# Patient Record
Sex: MALE | Race: BLACK OR AFRICAN AMERICAN | NOT HISPANIC OR LATINO | URBAN - METROPOLITAN AREA
[De-identification: names, ages, dates, MRNs, and addresses within clinical notes are randomized per-mention and may not be internally consistent; named-entity substitution may affect disease eponyms.]

---

## 2023-07-26 ENCOUNTER — NON-PROVIDER VISIT (OUTPATIENT)
Dept: OCCUPATIONAL MEDICINE | Facility: CLINIC | Age: 58
End: 2023-07-26

## 2023-07-26 DIAGNOSIS — Z02.1 PRE-EMPLOYMENT HEALTH SCREENING EXAMINATION: ICD-10-CM

## 2023-07-26 DIAGNOSIS — Z02.1 PRE-EMPLOYMENT DRUG SCREENING: ICD-10-CM

## 2023-07-26 LAB
AMP AMPHETAMINE: NORMAL
COC COCAINE: NORMAL
INT CON NEG: NORMAL
INT CON POS: NORMAL
MET METHAMPHETAMINES: NORMAL
OPI OPIATES: NORMAL
PCP PHENCYCLIDINE: NORMAL
POC DRUG COMMENT 753798-OCCUPATIONAL HEALTH: NEGATIVE
THC: NORMAL

## 2023-07-26 PROCEDURE — 80305 DRUG TEST PRSMV DIR OPT OBS: CPT | Performed by: PREVENTIVE MEDICINE

## 2025-04-24 ENCOUNTER — PHARMACY VISIT (OUTPATIENT)
Dept: PHARMACY | Facility: MEDICAL CENTER | Age: 60
End: 2025-04-24
Payer: COMMERCIAL

## 2025-04-24 ENCOUNTER — APPOINTMENT (OUTPATIENT)
Dept: RADIOLOGY | Facility: MEDICAL CENTER | Age: 60
End: 2025-04-24
Attending: STUDENT IN AN ORGANIZED HEALTH CARE EDUCATION/TRAINING PROGRAM
Payer: MEDICAID

## 2025-04-24 ENCOUNTER — HOSPITAL ENCOUNTER (EMERGENCY)
Facility: MEDICAL CENTER | Age: 60
End: 2025-04-24
Attending: STUDENT IN AN ORGANIZED HEALTH CARE EDUCATION/TRAINING PROGRAM
Payer: MEDICAID

## 2025-04-24 VITALS
HEART RATE: 76 BPM | HEIGHT: 70 IN | WEIGHT: 135 LBS | TEMPERATURE: 99 F | SYSTOLIC BLOOD PRESSURE: 133 MMHG | OXYGEN SATURATION: 91 % | BODY MASS INDEX: 19.33 KG/M2 | RESPIRATION RATE: 16 BRPM | DIASTOLIC BLOOD PRESSURE: 83 MMHG

## 2025-04-24 DIAGNOSIS — N20.0 KIDNEY STONE: ICD-10-CM

## 2025-04-24 LAB
ALBUMIN SERPL BCP-MCNC: 4 G/DL (ref 3.2–4.9)
ALBUMIN/GLOB SERPL: 1.2 G/DL
ALP SERPL-CCNC: 71 U/L (ref 30–99)
ALT SERPL-CCNC: 10 U/L (ref 2–50)
ANION GAP SERPL CALC-SCNC: 14 MMOL/L (ref 7–16)
APPEARANCE UR: CLEAR
AST SERPL-CCNC: 23 U/L (ref 12–45)
BACTERIA #/AREA URNS HPF: ABNORMAL /HPF
BASOPHILS # BLD AUTO: 0.5 % (ref 0–1.8)
BASOPHILS # BLD: 0.07 K/UL (ref 0–0.12)
BILIRUB SERPL-MCNC: 0.4 MG/DL (ref 0.1–1.5)
BILIRUB UR QL STRIP.AUTO: NEGATIVE
BUN SERPL-MCNC: 14 MG/DL (ref 8–22)
CALCIUM ALBUM COR SERPL-MCNC: 9.5 MG/DL (ref 8.5–10.5)
CALCIUM SERPL-MCNC: 9.5 MG/DL (ref 8.5–10.5)
CASTS URNS QL MICRO: ABNORMAL /LPF (ref 0–2)
CHLORIDE SERPL-SCNC: 104 MMOL/L (ref 96–112)
CO2 SERPL-SCNC: 19 MMOL/L (ref 20–33)
COLOR UR: YELLOW
CREAT SERPL-MCNC: 1.23 MG/DL (ref 0.5–1.4)
EOSINOPHIL # BLD AUTO: 0.05 K/UL (ref 0–0.51)
EOSINOPHIL NFR BLD: 0.3 % (ref 0–6.9)
EPITHELIAL CELLS 1715: ABNORMAL /HPF (ref 0–5)
ERYTHROCYTE [DISTWIDTH] IN BLOOD BY AUTOMATED COUNT: 42.1 FL (ref 35.9–50)
GFR SERPLBLD CREATININE-BSD FMLA CKD-EPI: 68 ML/MIN/1.73 M 2
GLOBULIN SER CALC-MCNC: 3.3 G/DL (ref 1.9–3.5)
GLUCOSE SERPL-MCNC: 108 MG/DL (ref 65–99)
GLUCOSE UR STRIP.AUTO-MCNC: NEGATIVE MG/DL
HCT VFR BLD AUTO: 40.7 % (ref 42–52)
HGB BLD-MCNC: 14.5 G/DL (ref 14–18)
IMM GRANULOCYTES # BLD AUTO: 0.07 K/UL (ref 0–0.11)
IMM GRANULOCYTES NFR BLD AUTO: 0.5 % (ref 0–0.9)
KETONES UR STRIP.AUTO-MCNC: 15 MG/DL
LEUKOCYTE ESTERASE UR QL STRIP.AUTO: ABNORMAL
LIPASE SERPL-CCNC: 23 U/L (ref 11–82)
LYMPHOCYTES # BLD AUTO: 1.21 K/UL (ref 1–4.8)
LYMPHOCYTES NFR BLD: 8.3 % (ref 22–41)
MCH RBC QN AUTO: 33.9 PG (ref 27–33)
MCHC RBC AUTO-ENTMCNC: 35.6 G/DL (ref 32.3–36.5)
MCV RBC AUTO: 95.1 FL (ref 81.4–97.8)
MICRO URNS: ABNORMAL
MONOCYTES # BLD AUTO: 1.04 K/UL (ref 0–0.85)
MONOCYTES NFR BLD AUTO: 7.1 % (ref 0–13.4)
NEUTROPHILS # BLD AUTO: 12.15 K/UL (ref 1.82–7.42)
NEUTROPHILS NFR BLD: 83.3 % (ref 44–72)
NITRITE UR QL STRIP.AUTO: NEGATIVE
NRBC # BLD AUTO: 0 K/UL
NRBC BLD-RTO: 0 /100 WBC (ref 0–0.2)
PH UR STRIP.AUTO: 6 [PH] (ref 5–8)
PLATELET # BLD AUTO: 256 K/UL (ref 164–446)
PMV BLD AUTO: 10.2 FL (ref 9–12.9)
POTASSIUM SERPL-SCNC: 3.4 MMOL/L (ref 3.6–5.5)
PROT SERPL-MCNC: 7.3 G/DL (ref 6–8.2)
PROT UR QL STRIP: 30 MG/DL
RBC # BLD AUTO: 4.28 M/UL (ref 4.7–6.1)
RBC # URNS HPF: >100 /HPF (ref 0–2)
RBC UR QL AUTO: ABNORMAL
SODIUM SERPL-SCNC: 137 MMOL/L (ref 135–145)
SP GR UR STRIP.AUTO: 1.04
UROBILINOGEN UR STRIP.AUTO-MCNC: 1 EU/DL
WBC # BLD AUTO: 14.6 K/UL (ref 4.8–10.8)
WBC #/AREA URNS HPF: ABNORMAL /HPF

## 2025-04-24 PROCEDURE — 700105 HCHG RX REV CODE 258: Performed by: STUDENT IN AN ORGANIZED HEALTH CARE EDUCATION/TRAINING PROGRAM

## 2025-04-24 PROCEDURE — A9270 NON-COVERED ITEM OR SERVICE: HCPCS | Performed by: STUDENT IN AN ORGANIZED HEALTH CARE EDUCATION/TRAINING PROGRAM

## 2025-04-24 PROCEDURE — 36415 COLL VENOUS BLD VENIPUNCTURE: CPT

## 2025-04-24 PROCEDURE — 700111 HCHG RX REV CODE 636 W/ 250 OVERRIDE (IP): Mod: JZ,UD | Performed by: STUDENT IN AN ORGANIZED HEALTH CARE EDUCATION/TRAINING PROGRAM

## 2025-04-24 PROCEDURE — 96375 TX/PRO/DX INJ NEW DRUG ADDON: CPT

## 2025-04-24 PROCEDURE — 80053 COMPREHEN METABOLIC PANEL: CPT

## 2025-04-24 PROCEDURE — 74177 CT ABD & PELVIS W/CONTRAST: CPT

## 2025-04-24 PROCEDURE — 96374 THER/PROPH/DIAG INJ IV PUSH: CPT

## 2025-04-24 PROCEDURE — 700117 HCHG RX CONTRAST REV CODE 255: Performed by: STUDENT IN AN ORGANIZED HEALTH CARE EDUCATION/TRAINING PROGRAM

## 2025-04-24 PROCEDURE — 96376 TX/PRO/DX INJ SAME DRUG ADON: CPT

## 2025-04-24 PROCEDURE — 99285 EMERGENCY DEPT VISIT HI MDM: CPT

## 2025-04-24 PROCEDURE — 81001 URINALYSIS AUTO W/SCOPE: CPT

## 2025-04-24 PROCEDURE — RXMED WILLOW AMBULATORY MEDICATION CHARGE: Performed by: STUDENT IN AN ORGANIZED HEALTH CARE EDUCATION/TRAINING PROGRAM

## 2025-04-24 PROCEDURE — 700102 HCHG RX REV CODE 250 W/ 637 OVERRIDE(OP): Performed by: STUDENT IN AN ORGANIZED HEALTH CARE EDUCATION/TRAINING PROGRAM

## 2025-04-24 PROCEDURE — 85025 COMPLETE CBC W/AUTO DIFF WBC: CPT

## 2025-04-24 PROCEDURE — 83690 ASSAY OF LIPASE: CPT

## 2025-04-24 RX ORDER — KETOROLAC TROMETHAMINE 15 MG/ML
15 INJECTION, SOLUTION INTRAMUSCULAR; INTRAVENOUS ONCE
Status: COMPLETED | OUTPATIENT
Start: 2025-04-24 | End: 2025-04-24

## 2025-04-24 RX ORDER — MORPHINE SULFATE 4 MG/ML
4 INJECTION INTRAVENOUS
Status: DISCONTINUED | OUTPATIENT
Start: 2025-04-24 | End: 2025-04-25 | Stop reason: HOSPADM

## 2025-04-24 RX ORDER — MORPHINE SULFATE 4 MG/ML
4 INJECTION INTRAVENOUS ONCE
Status: COMPLETED | OUTPATIENT
Start: 2025-04-24 | End: 2025-04-24

## 2025-04-24 RX ORDER — SODIUM CHLORIDE 9 MG/ML
1000 INJECTION, SOLUTION INTRAVENOUS ONCE
Status: COMPLETED | OUTPATIENT
Start: 2025-04-24 | End: 2025-04-24

## 2025-04-24 RX ORDER — HYDROCODONE BITARTRATE AND ACETAMINOPHEN 5; 325 MG/1; MG/1
1-2 TABLET ORAL EVERY 4 HOURS PRN
Qty: 16 TABLET | Refills: 0 | Status: SHIPPED | OUTPATIENT
Start: 2025-04-24 | End: 2025-05-01

## 2025-04-24 RX ORDER — TAMSULOSIN HYDROCHLORIDE 0.4 MG/1
0.4 CAPSULE ORAL DAILY
Qty: 30 CAPSULE | Refills: 0 | Status: SHIPPED | OUTPATIENT
Start: 2025-04-24

## 2025-04-24 RX ORDER — IBUPROFEN 600 MG/1
600 TABLET, FILM COATED ORAL EVERY 6 HOURS PRN
Qty: 30 TABLET | Refills: 0 | Status: SHIPPED | OUTPATIENT
Start: 2025-04-24

## 2025-04-24 RX ORDER — ALUMINA, MAGNESIA, AND SIMETHICONE 2400; 2400; 240 MG/30ML; MG/30ML; MG/30ML
15 SUSPENSION ORAL ONCE
Status: COMPLETED | OUTPATIENT
Start: 2025-04-24 | End: 2025-04-24

## 2025-04-24 RX ADMIN — MORPHINE SULFATE 4 MG: 4 INJECTION, SOLUTION INTRAMUSCULAR; INTRAVENOUS at 20:30

## 2025-04-24 RX ADMIN — IOHEXOL 75 ML: 350 INJECTION, SOLUTION INTRAVENOUS at 20:45

## 2025-04-24 RX ADMIN — SODIUM CHLORIDE 1000 ML: 9 INJECTION, SOLUTION INTRAVENOUS at 19:51

## 2025-04-24 RX ADMIN — KETOROLAC TROMETHAMINE 15 MG: 15 INJECTION, SOLUTION INTRAMUSCULAR; INTRAVENOUS at 21:19

## 2025-04-24 RX ADMIN — MORPHINE SULFATE 4 MG: 4 INJECTION, SOLUTION INTRAMUSCULAR; INTRAVENOUS at 19:51

## 2025-04-24 RX ADMIN — ALUMINUM HYDROXIDE, MAGNESIUM HYDROXIDE, AND DIMETHICONE 15 ML: 400; 400; 40 SUSPENSION ORAL at 19:51

## 2025-04-24 ASSESSMENT — PAIN DESCRIPTION - PAIN TYPE
TYPE: ACUTE PAIN
TYPE: DEEP SOMATIC PAIN;ACUTE PAIN

## 2025-04-25 NOTE — ED PROVIDER NOTES
ED Provider Note    CHIEF COMPLAINT  Chief Complaint   Patient presents with    Abdominal Pain     BIBA from home. Patient c/o of LLQ since last night. Patient has hx of Crohns.       LIMITATION TO HISTORY   Select: None    HPI    Jose Ramon Ziegler is a 60 y.o. male with a history of Crohn's disease who presents to the Emergency Department via EMS for evaluation of left lower quadrant abdominal pain onset last night. He reports associated constipation with his last bowel movement being 2 days ago, nausea, vomiting, and inability to pas gas. He denies any diarrhea. The patient reports he does not take any medications for his Crohn's and he also has a history of bowel resection with approximately 1 foot of intestine removed.     OUTSIDE HISTORIAN(S):  Select: Nine    EXTERNAL RECORDS REVIEWED  Select: The patient was seen in the [facility] ED on 12/22/23 for evaluation of migraines and had a head CT done that was normal. He has a history of hypertension.    PAST MEDICAL HISTORY  History reviewed. No pertinent past medical history.    SURGICAL HISTORY  History reviewed. No pertinent surgical history.    FAMILY HISTORY  History reviewed. No pertinent family history.     SOCIAL HISTORY  Social History     Socioeconomic History    Marital status: Single     Spouse name: Not on file    Number of children: Not on file    Years of education: Not on file    Highest education level: Not on file   Occupational History    Not on file   Tobacco Use    Smoking status: Every Day     Types: Cigarettes    Smokeless tobacco: Never   Substance and Sexual Activity    Alcohol use: Not Currently    Drug use: Never    Sexual activity: Not on file   Other Topics Concern    Not on file   Social History Narrative    Not on file     Social Drivers of Health     Financial Resource Strain: Not on File (8/19/2019)    Received from [facility]    Financial Resource Strain     Financial Resource Strain: 0   Food Insecurity: Not on File  "(2019)    Received from [employer]    Food Insecurity     Food: 0   Transportation Needs: Not on File (2019)    Received from [employer]    Transportation Needs     Transportation: 0   Physical Activity: Not on File (2019)    Received from [employer]    Physical Activity     Physical Activity: 0   Stress: Not on File (2019)    Received from [employer]    Stress     Stress: 0   Social Connections: Not on File (2019)    Received from [employer]    Social Connections     Social Connections and Isolation: 0   Intimate Partner Violence: Not on file   Housing Stability: Not on File (2019)    Received from [employer]    Housing Stability     Housin       CURRENT MEDICATIONS  No current facility-administered medications on file prior to encounter.     No current outpatient medications on file prior to encounter.       ALLERGIES  No Known Allergies    PHYSICAL EXAM  VITAL SIGNS:BP (!) 166/95   Pulse 66   Temp 36.8 °C (98.3 °F) (Temporal)   Resp 18   Ht 1.778 m (5' 10\")   Wt 61.2 kg (135 lb)   SpO2 97%   BMI 19.37 kg/m²       GENERAL: Awake and alert  HEAD: Normocephalic and atraumatic  NECK: Normal range of motion, without meningismus  EYES: Pupils Equal, Round, Reactive to Light, extraocular movements intact, conjunctiva white  ENT: Mucous membranes moist, oropharynx clear  PULMONARY: Normal effort, clear to auscultation  CARDIOVASCULAR: No murmurs, clicks or rubs, peripheral pulses 2+  ABDOMINAL: Soft, left lower quadrant tenderness, no guarding or rigidity present, no pulsatile masses  BACK: no midline tenderness, no costovertebral tenderness  NEUROLOGICAL: Grossly non-focal neurological examination, speech normal, gait normal  EXTREMITIES: No edema, normal to inspection  SKIN: Warm and dry.  PSYCHIATRIC: Affect is appropriate    DIAGNOSTIC STUDIES    LABS  Labs Reviewed   CBC WITH DIFFERENTIAL - Abnormal; Notable for the following components:       Result Value    WBC 14.6 (*)     RBC 4.28 (*)     Hematocrit " 40.7 (*)     MCH 33.9 (*)     Neutrophils-Polys 83.30 (*)     Lymphocytes 8.30 (*)     Neutrophils (Absolute) 12.15 (*)     Monos (Absolute) 1.04 (*)     All other components within normal limits   COMP METABOLIC PANEL - Abnormal; Notable for the following components:    Potassium 3.4 (*)     Co2 19 (*)     Glucose 108 (*)     All other components within normal limits   URINALYSIS - Abnormal; Notable for the following components:    Ketones 15 (*)     Protein 30 (*)     Leukocyte Esterase Trace (*)     Occult Blood Large (*)     All other components within normal limits   URINE MICROSCOPIC (W/UA) - Abnormal; Notable for the following components:    WBC 6-10 (*)     RBC >100 (*)     All other components within normal limits   LIPASE   ESTIMATED GFR     All labs reviewed by me.     RADIOLOGY  I have independently interpreted the diagnostic imaging associated with this visit and am waiting the final reading from the radiologist.   My preliminary interpretation is as follows: no SBO    Formal Radiologist interpretation:  CT-ABDOMEN-PELVIS WITH   Final Result      1.  Obstructing 3 mm stone in the left proximal ureter results in mild left hydronephrosis.   2.  Additional nonobstructing renal stones bilaterally. No right hydronephrosis.   3.  A 1.1 cm hypodense lesion in the inferior right hepatic lobe, indeterminate. It can be further evaluated with contrast-enhanced liver protocol MRI on an outpatient basis.   4.  Emphysema.          COURSE & MEDICAL DECISION MAKING    ED COURSE:    INTERVENTIONS BY ME:  Medications   morphine 4 MG/ML injection 4 mg (4 mg Intravenous Given 4/24/25 2030)   NS (Bolus) 0.9 % infusion 1,000 mL (0 mL Intravenous Stopped 4/24/25 2059)   mag hydrox-al hydrox-simeth (Maalox Plus Es Or Mylanta Ds) suspension 15 mL (15 mL Oral Given 4/24/25 1951)   morphine 4 MG/ML injection 4 mg (4 mg Intravenous Given 4/24/25 1951)   iohexol (OMNIPAQUE) 350 mg/mL (IV) (75 mL Intravenous Given 4/24/25 2045)    ketorolac (Toradol) 15 MG/ML injection 15 mg (15 mg Intravenous Given 4/24/25 2119)       Response on recheck: improved    7:43 PM - Patient seen and examined at bedside. This is a 60 year old man with a history of bowel resection with approximately 1 foot of intestine removed who presents to the ED via EMS for evaluation of left lower quadrant abdominal pain onset last night with associated constipation with his last bowel movement being 2 days ago, nausea, vomiting, and inability to pas gas. Patient will be treated with IV morphine for pain, NS 1 L IV, and Maalox 15 mL PO.     9:09 PM = The patient is still complaining of pain and will be medicated with Toradol 15 mg IV.    10:20 PM - 12:32 AM - I reevaluated the patient at bedside. The patient informs me they feel improved following morphine, fluids, Maalox, and Toradol administration. I discussed the patient's diagnostic study results. I discussed plan for discharge and follow up as outlined below. The patient is stable for discharge at this time and will return for any new or worsening symptoms. Patient verbalizes understanding and support with my plan for discharge.      INITIAL ASSESSMENT, COURSE AND PLAN  Care Narrative:     Hydration: Based on the patient's presentation of Acute Vomiting the patient was given IV fluids. IV Hydration was used because oral hydration was not adequate alone. Upon recheck following hydration, the patient was improved.      This is a 60-year-old male with Crohn’s disease and prior bowel resection presenting with acute left lower quadrant pain, nausea, vomiting, and obstipation. Given his history, serious concerns included bowel obstruction, intra-abdominal abscess, and ischemic bowel. His physical exam was benign without peritoneal signs, and initial labs showed leukocytosis and microscopic hematuria. CT imaging revealed a 3 mm obstructing left proximal ureteral stone with mild hydronephrosis, without signs of bowel  obstruction. He was treated with IV fluids, analgesics, and anti-inflammatories with improvement. The patient was discharged in stable condition with prescriptions and follow-up arranged, and return precautions for worsening symptoms.    ADDITIONAL PROBLEM LIST      DISPOSITION AND DISCUSSIONS  Discussion of management with other [facility] or appropriate source(s): None    I have discussed management of the patient with the following physicians and KIRSTEN's:  None    Escalation of care considered, and ultimately not performed:    Barriers to care at this time, including but not limited to: Patient does not have established PCP.     Decision tools and prescription drugs considered including, but not limited to: Norco, ibuprofen, Flomax.     I reviewed prescription monitoring program for patient's narcotic use before prescribing a scheduled drug.The patient will not drink alcohol nor drive with prescribed medications. The patient will return for new or worsening symptoms and is stable at the time of discharge.    The patient is referred to a primary physician for blood pressure management, diabetic screening, and for all other preventative health concerns.    In prescribing controlled substances to this patient, I certify that I have obtained and reviewed the medical history of Jose Ramon Ziegler. I have also made a good evi effort to obtain applicable records from other providers who have treated the patient and records demonstrating the following: low use .     I have conducted a physical exam and documented it. I have reviewed Mr. Ziegler’s prescription history as maintained by the [place] Prescription Monitoring Program.     I have assessed the patient’s risk for abuse, dependency, and addiction using the validated Opioid Risk Tool available at https://www.mdcalc.com/xfsppl-nerm-cxfs-ort-narcotic-abuse.     Given the above, I believe the benefits of controlled substance therapy outweigh the risks. The reasons for  prescribing controlled substances include non-narcotic, oral analgesic alternatives have been inadequate for pain control. Accordingly, I have discussed the risk and benefits, treatment plan, and alternative therapies with the patient.      DISPOSITION:  Patient will be discharged home in stable condition.    FOLLOW UP:  [facility] [address]  [place] [address]  [phone]          OUTPATIENT MEDICATIONS:  Discharge Medication List as of 4/24/2025 10:29 PM        START taking these medications    Details   HYDROcodone-acetaminophen (NORCO) 5-325 MG Tab per tablet Take 1-2 Tablets by mouth every four hours as needed (pain) for up to 7 days., Disp-16 Tablet, R-0, Normal      ibuprofen (MOTRIN) 600 MG Tab Take 1 Tablet by mouth every 6 hours as needed for Moderate Pain., Disp-30 Tablet, R-0, Normal      tamsulosin (FLOMAX) 0.4 MG capsule Take 1 Capsule by mouth every day., Disp-30 Capsule, R-0, Normal               FINAL DIAGNOSIS  1. Kidney stone        Gurjit QUINTEROS (Scribmartah), am scribing for, and in the presence of, Gerald Harrington.    Electronically signed by: Gurjit Corrigan (Scribe), 4/24/2025    IGerald personally performed the services described in this documentation, as scribed by Gurjit Corrigan in my presence, and it is both accurate and complete.     Electronically signed by: Gerald Harrington DO ,12:35 AM 04/24/25

## 2025-04-25 NOTE — ED TRIAGE NOTES
Chief Complaint   Patient presents with    Abdominal Pain     BIBA from home. Patient c/o of LLQ since last night. Patient has hx of Crohns.     Patient was given 100 fent, 4 zofran to right forearm 18gage. Per EMS, ,GCS 15.  Patient denies changes to urine or bowel movements.    Patient  in wheelchair to triage for above complaint.   Patient is alert and oriented, speaking in full sentences, follows commands and responds appropriately to questions. Not in any apparent distress.   Respirations are even and unlabored.  Patient placed in line for blood draw. Patient educated on triage process. Patient encouraged to alert staff for any changes.